# Patient Record
Sex: FEMALE | Race: BLACK OR AFRICAN AMERICAN | NOT HISPANIC OR LATINO | Employment: OTHER | URBAN - METROPOLITAN AREA
[De-identification: names, ages, dates, MRNs, and addresses within clinical notes are randomized per-mention and may not be internally consistent; named-entity substitution may affect disease eponyms.]

---

## 2019-10-04 ENCOUNTER — HISTORICAL (OUTPATIENT)
Dept: RADIOLOGY | Facility: HOSPITAL | Age: 54
End: 2019-10-04

## 2022-04-27 ENCOUNTER — HISTORICAL (OUTPATIENT)
Dept: SURGERY | Facility: HOSPITAL | Age: 57
End: 2022-04-27

## 2022-04-27 LAB
CBG: 115 (ref 70–115)
CBG: 164 (ref 70–115)

## 2022-05-14 NOTE — OP NOTE
DATE OF SURGERY:    04/27/2022    SURGEON:  Karma Lovett III, MD    NAME OF OPERATION:  Bilateral myringotomy with tubes, adenoidectomy, and endoscopic sinus surgery with bilateral anterior and posterior ethmoidectomies, bilateral middle meatus antrostomies, bilateral sphenoidectomy and bilateral inferior turbinate reduction.    PREOP DIAGNOSIS:    1. Otitis media.  2. Adenoid hypertrophy.  3. Sinusitis with turbinate hypertrophy.    POSTOP DIAGNOSIS:    1. Otitis media.  2. Adenoid hypertrophy.  3. Sinusitis with turbinate hypertrophy.    ANESTHESIA:  General endotracheal anesthesia.    ESTIMATED BLOOD LOSS:  50 cc.    COMPLICATIONS:  None.    INDICATIONS FOR SURGERY:  The patient is a 56-year-old female with otitis media, adenoid hypertrophy and sinusitis with turbinate hypertrophy unresponsive to medical treatment.    PROCEDURE IN DETAIL:  After appropriate consents were obtained, the patient was taken to the operating room and placed supine on the OR table.  Under general endotracheal anesthesia, she was prepped and draped in a sterile fashion.  The microscope was first used to visualize the patient's left ear.  A small amount of cerumen was cleared from the external auditory canal and alcohol was used to prep the external auditory canal.  A myringotomy knife was used to make an incision in the anterior-inferior quadrant of the tympanic membrane.  A small amount of serous fluid was aspirated from the middle ear.  A myringotomy tube was placed in the tympanic membrane without difficulty.  Antibiotic drops were instilled in the external auditory canal and a cotton ball was placed in the external auditory canal.  A similar procedure was performed on the patient's opposite ear.  The patient also had serous fluid present in the right middle ear.  The PE tube was placed on that side also without difficulty.  Attention was then turned toward performing the adenoidectomy.  The patient's nose was packed with Afrin  and injected with lidocaine 1% with epinephrine.  A Paradise-Silvio mouth gag was placed without difficulty.  The patient's hard and soft palate were palpated and visualized and noted to be normal.  A red rubber catheter was passed through the patient's right nostril and tied over the maxillary teeth.  The adenoid pad was visualized and noted to be surprisingly enlarged.  We removed the adenoid pad using adenoid curettes of various size.  We labeled the specimen for the pathologist to examine closely because I wanted to make sure this is not an unusual tumor like a nasopharyngeal carcinoma.  The suction Bovie was used to cauterize the adenoid base.  The adenoid mass went from the nasopharynx into the back of the nasal cavity.  Attention was then turned toward performing the endoscopic sinus surgery.  The endoscopes were used throughout the rest of the case for better visualization.  Attention was first turned to the patient's left side.  A sickle knife was used to make an incision over the uncinate process on the left.  The anterior and posterior ethmoid air cells were exonerated in a routine fashion using various instruments.  Care was taken not to injure the medial orbital wall of the floor of the anterior cranial fossa.  The anterior face of the sphenoid sinus was removed.  Thickened mucous membranes were removed from the sphenoid sinus.  A curved suction was introduced into the maxillary sinus.  The natural os to the maxillary sinus was enlarged using various instruments.  Thickened mucous membranes and purulent material were removed from the maxillary sinus.  A sickle knife was used to make an incision across the anterior aspect of the inferior turbinate.  The Jeff elevator was used to elevate the mucosa off the turbinate bone.  Small piece of turbinate bone and mucosa were removed without difficulty.  The suction Bovie was used to cauterize the inferior aspect of the inferior turbinate.  A similar procedure was  then performed on the patient's right side.  The patient also had thickened mucous membranes and purulent material present in the maxillary, ethmoid and sphenoid sinuses on the right.  The inferior turbinate was reduced in a similar fashion.  The patient was irrigated with copious amounts of saline and noted to have good hemostasis and a good result.  Telfa packs were placed without difficulty.  The patient tolerated the procedure well and was taken in stable condition from the operating room to the recovery room.        ______________________________  MD TALITA Galeano III/CIARAN  DD:  04/27/2022  Time:  08:09AM  DT:  04/27/2022  Time:  08:22AM  Job #:  [id number]

## 2022-07-13 DIAGNOSIS — R51.9 NONINTRACTABLE HEADACHE, UNSPECIFIED CHRONICITY PATTERN, UNSPECIFIED HEADACHE TYPE: Primary | ICD-10-CM

## 2023-01-19 DIAGNOSIS — C67.9 MALIGNANT NEOPLASM OF URINARY BLADDER, UNSPECIFIED SITE: Primary | ICD-10-CM

## 2023-04-18 ENCOUNTER — LAB VISIT (OUTPATIENT)
Dept: LAB | Facility: HOSPITAL | Age: 58
End: 2023-04-18
Attending: OPHTHALMOLOGY
Payer: MEDICAID

## 2023-04-18 DIAGNOSIS — H34.8111 CENTRAL RETINAL VEIN OCCLUSION WITH NEOVASCULARIZATION OF RIGHT EYE: Primary | ICD-10-CM

## 2023-04-18 DIAGNOSIS — H34.8111 CENTRAL RETINAL VEIN OCCLUSION WITH NEOVASCULARIZATION OF RIGHT EYE: ICD-10-CM

## 2023-04-18 LAB
ANION GAP SERPL CALC-SCNC: 6 MEQ/L
BUN SERPL-MCNC: 6.6 MG/DL (ref 9.8–20.1)
CALCIUM SERPL-MCNC: 9.4 MG/DL (ref 8.4–10.2)
CHLORIDE SERPL-SCNC: 107 MMOL/L (ref 98–107)
CO2 SERPL-SCNC: 29 MMOL/L (ref 22–29)
CREAT SERPL-MCNC: 0.77 MG/DL (ref 0.55–1.02)
CREAT/UREA NIT SERPL: 9
GFR SERPLBLD CREATININE-BSD FMLA CKD-EPI: >60 MLS/MIN/1.73/M2
GLUCOSE SERPL-MCNC: 102 MG/DL (ref 74–100)
POTASSIUM SERPL-SCNC: 3.9 MMOL/L (ref 3.5–5.1)
SODIUM SERPL-SCNC: 142 MMOL/L (ref 136–145)

## 2023-04-18 PROCEDURE — 36415 COLL VENOUS BLD VENIPUNCTURE: CPT

## 2023-04-18 PROCEDURE — 80048 BASIC METABOLIC PNL TOTAL CA: CPT
